# Patient Record
Sex: MALE | Race: WHITE
[De-identification: names, ages, dates, MRNs, and addresses within clinical notes are randomized per-mention and may not be internally consistent; named-entity substitution may affect disease eponyms.]

---

## 2019-04-09 ENCOUNTER — HOSPITAL ENCOUNTER (EMERGENCY)
Dept: HOSPITAL 97 - ER | Age: 69
Discharge: HOME | End: 2019-04-09
Payer: COMMERCIAL

## 2019-04-09 DIAGNOSIS — R10.9: Primary | ICD-10-CM

## 2019-04-09 DIAGNOSIS — E78.5: ICD-10-CM

## 2019-04-09 DIAGNOSIS — I10: ICD-10-CM

## 2019-04-09 DIAGNOSIS — E11.9: ICD-10-CM

## 2019-04-09 LAB
ALBUMIN SERPL BCP-MCNC: 3.5 G/DL (ref 3.4–5)
ALP SERPL-CCNC: 57 U/L (ref 45–117)
ALT SERPL W P-5'-P-CCNC: 39 U/L (ref 12–78)
AST SERPL W P-5'-P-CCNC: 25 U/L (ref 15–37)
BUN BLD-MCNC: 17 MG/DL (ref 7–18)
GLUCOSE SERPLBLD-MCNC: 102 MG/DL (ref 74–106)
HCT VFR BLD CALC: 44 % (ref 39.6–49)
LIPASE SERPL-CCNC: 162 U/L (ref 73–393)
LYMPHOCYTES # SPEC AUTO: 1.6 K/UL (ref 0.7–4.9)
PMV BLD: 9.7 FL (ref 7.6–11.3)
POTASSIUM SERPL-SCNC: 4.1 MMOL/L (ref 3.5–5.1)
RBC # BLD: 5.04 M/UL (ref 4.33–5.43)
UA COMPLETE W REFLEX CULTURE PNL UR: (no result)

## 2019-04-09 PROCEDURE — 36415 COLL VENOUS BLD VENIPUNCTURE: CPT

## 2019-04-09 PROCEDURE — 83690 ASSAY OF LIPASE: CPT

## 2019-04-09 PROCEDURE — 81015 MICROSCOPIC EXAM OF URINE: CPT

## 2019-04-09 PROCEDURE — 80076 HEPATIC FUNCTION PANEL: CPT

## 2019-04-09 PROCEDURE — 81003 URINALYSIS AUTO W/O SCOPE: CPT

## 2019-04-09 PROCEDURE — 93005 ELECTROCARDIOGRAM TRACING: CPT

## 2019-04-09 PROCEDURE — 85025 COMPLETE CBC W/AUTO DIFF WBC: CPT

## 2019-04-09 PROCEDURE — 80048 BASIC METABOLIC PNL TOTAL CA: CPT

## 2019-04-09 PROCEDURE — 99283 EMERGENCY DEPT VISIT LOW MDM: CPT

## 2019-04-09 NOTE — XMS REPORT
Marky Hendrickson DO PA

 Created on:2019



Patient:López Donaldson

Sex:Male

:1950

External Reference #:[id number]





Demographics







 Address  [address] [place] [address]

 

 Phone  [phone]

 

 Preferred Language  Unknown

 

 Marital Status  Unknown

 

 Pentecostalism Affiliation  Unknown

 

 Race  Unknown

 

 Ethnic Group  Unknown









Author







 Organization  [employer]









Care Team Providers







 Name  Role  Phone

 

 Marky Hendrickson  Provider Role  Unavailable









Allergies

No Known Allergies



Problems







 Problem Type  Condition  Code  Onset Dates  Condition Status

 

 Problem  Mixed hyperlipidemia  E78.2    Active

 

 Problem  Generalized anxiety disorder  F41.1    Active

 

 Problem  Hypersomnia due to another medical  G47.14    Active



   condition      

 

 Problem  Pain in left hip  M25.552    Active

 

 Problem  Generalized abdominal pain  R10.84    Active

 

 Problem  Other chronic pain  G89.29    Active

 

 Problem  Type 2 diabetes mellitus with  E11.40    Active



   diabetic neuropathy, without      



   long-term current use of insulin      

 

 Problem  Chronic kidney disease, unspecified  N18.9    Active



   CKD stage      

 

 Problem  Vision decreased  H54.7    Active

 

 Problem  Routine medical exam  Z00.00    Active

 

 Problem  Hypertensive chronic kidney disease  I12.9    Active



   with stage 1 through stage 4      



   chronic kidney disease, or      



   unspecified chronic kidney disease      

 

 Problem  Essential (primary) hypertension  I10    Active

 

 Problem  DM renal manif type II  E11.29    Active

 

 Problem  Enlarged prostate without lower  N40.0    Active



   urinary tract symptoms (luts)      

 

 Problem  Chronic major depressive disorder,  F33.9    Active



   recurrent episode      

 

 Problem  DM neuro manif type II  E11.49    Active

 

 Problem  Chronic kidney disease due to  E11.22    Active



   diabetes mellitus      

 

 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







Medications

No Known Medications



Results

No Known Results



Summary Purpose

[employer]mission

## 2019-04-09 NOTE — XMS REPORT
Marky Hendrickson DO, PA

 Created on:2018



Patient:López Donaldson

Sex:Male

:1950

External Reference #:[id number]





Demographics







 [place] [address]

 

 Phone  [phone]

 

 Preferred Language  Unknown

 

 Marital Status  Unknown

 

 Sabianism Affiliation  Unknown

 

 Race  Unknown

 

 Ethnic Group  Unknown









Author







 [facility]









Care Team Providers







 Name  Role  Phone

 

 Marky Hendrickson  Provider Role  Unavailable









Allergies, Adverse Reactions, Alerts







 Substance  Reaction  Event Type

 

 Morphine Sulfate  Info Not Available  Drug Allergy







Problems







 Problem Type  Condition  Code  Onset Dates  Condition Status

 

 Assessment  Essential (primary) hypertension  I10    Active

 

 Assessment  Chronic major depressive disorder,  F33.9    Active



   recurrent episode      

 

 Assessment  Generalized anxiety disorder  F41.1    Active

 

 Assessment  Body mass index (BMI) of 27.0-27.9  Z68.27    Active



   in adult      

 

 Assessment  Hypersomnia due to another medical  G47.14    Active



   condition      

 

 Assessment  Chronic kidney disease, unspecified  N18.9    Active



   CKD stage      

 

 Problem  Routine medical exam  Z00.00    Active

 

 Assessment  Type 2 diabetes mellitus with  E11.40    Active



   diabetic neuropathy, without      



   long-term current use of insulin      

 

 Problem  Enlarged prostate without lower  N40.0    Active



   urinary tract symptoms (luts)      

 

 Assessment  DM neuro manif type II  E11.49    Active

 

 Problem  DM neuro manif type II  E11.49    Active

 

 Problem  Hypertensive chronic kidney disease  I12.9    Active



   with stage 1 through stage 4      



   chronic kidney disease, or      



   unspecified chronic kidney disease      

 

 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

 

 Problem  Hypersomnia due to another medical  G47.14    Active



   condition      

 

 Problem  Mixed hyperlipidemia  E78.2    Active

 

 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

 

 Assessment  Hypertensive chronic kidney disease  I12.9    Active



   with stage 1 through stage 4      



   chronic kidney disease, or      



   unspecified chronic kidney disease      

 

 Problem  Generalized anxiety disorder  F41.1    Active

 

 Assessment  Generalized abdominal pain  R10.84    Active

 

 Problem  Chronic major depressive disorder,  F33.9    Active



   recurrent episode      

 

 Problem  Essential (primary) hypertension  I10    Active

 

 Problem  DM renal manif type II  E11.29    Active

 

 Problem  Chronic kidney disease due to  E11.22    Active



   diabetes mellitus      

 

 Assessment  Chronic kidney disease due to  E11.22    Active



   diabetes mellitus      

 

 Assessment  DM renal manif type II  E11.29    Active

 

 Assessment  Enlarged prostate without lower  N40.0    Active



   urinary tract symptoms (luts)      

 

 Assessment  Mixed hyperlipidemia  E78.2    Active

 

 Problem  Chronic kidney disease, unspecified  N18.9    Active



   CKD stage      

 

 Problem  Type 2 diabetes mellitus with  E11.40    Active



   diabetic neuropathy, without      



   long-term current use of insulin      

 

 Assessment  Routine medical exam  Z00.00    Active







Medications







 Medication  Code  Code  Instructions  Start  End  Status  Dosage



   System      Date  Date    

 

 Escitalopram  NDC  33960533151  10 mg Orally  Oct 22,    Active  1 tablet



 Oxalate      once every  2018      



       night        

 

 Tamsulosin HCl  NDC  [address]-77  0.4 MG Orally      Active  as directed

 

 GlyBURIDE  NDC  [id number]  5 MG Orally      Active  1 tablet



       Once a day        with



               breakfast



               or the



               first main



               meal of the



               day

 

 Aspirin 81  NDC  69961385620  81 MG Orally      Active  1 tablet



       Once a day        

 

 Simvastatin  NDC  23044171195  20 MG Orally      Active  1 tablet in



       Once a day        the evening

 

 Lisinopril  NDC  57000258946  40 MG Orally      Active  1 tablet



       Once a day        







Vital Signs







 Date/Time:  Oct 22, 2018

 

 BMI  27.40 Index

 

 Weight  191 lbs

 

 Height  70 in

 

 Temperature  98.1 F

 

 Cardiac Monitoring Heart Rate  84 /min

 

 Blood Pressure Diastolic  66 mm Hg

 

 Blood Pressure Systolic  136 mm Hg







Results

No Known Results



Summary Purpose

[employer] Submission

## 2019-04-09 NOTE — XMS REPORT
Continuity of Care Document

 Created on:2019



Patient:JOSEPH CEE

Sex:Male

:1950

External Reference #:[id number]





Demographics







 Address  [address] [place] [address]

 

 Phone  [phone]

 

 Preferred Language  Unknown

 

 Marital Status  Unknown

 

 Faith Affiliation  Unknown

 

 Race  Unknown

 

 Ethnic Group  Unknown









Author







 Organization  [employer]









Problems







 Problem  Status  Onset  Classification  Date  Comments  Source



     Date    Reported    



             



             



             

 

 Mixed  Active    Problem  2019    Marky MORALES



 hyperlipidemia            [facility]



             



             

 

 Generalized  Active    Problem  2019    Marky MORALES



 anxiety disorder            [facility]



             



             

 

 Hypersomnia due to  Active    Problem  2019    Marky MORALES



 another medical            [facility]



 condition            



             

 

 Pain in left hip  Active    Problem  2019    Marky E



             [facility]



             



             

 

 Generalized  Active    Problem  2019    Marky MORALES



 abdominal pain            [facility]



             



             

 

 Other chronic pain  Active    Problem  2019    Marky MORALES



             [facility]



             



             

 

 Type 2 diabetes  Active    Problem  2019    Marky MORALES



 mellitus with            [facility]



 diabetic            



 neuropathy,            



 without long-term            



 current use of            



 insulin            



             

 

 Chronic kidney  Active    Problem  2019    Marky MORALES



 disease,            [facility]



 unspecified CKD            



 stage            



             

 

 Vision decreased  Active    Problem  2019    Marky MORALES



             [facility]



             



             

 

 Routine medical  Active    Problem  2019    Marky MORALES



 exam            [facility]



             



             

 

 Hypertensive  Active    Problem  2019    Marky MORALES



 chronic kidney            [facility]



 disease with stage            



 1 through stage 4            



 chronic kidney            



 disease, or            



 unspecified            



 chronic kidney            



 disease            



             

 

 Essential  Active    Problem  2019    Marky MORALES



 hypertension            [facility]



             



             

 

 DM renal manif  Active    Problem  2019    Marky MORALES



 type II            [facility]



             



             

 

 Enlarged prostate  Active    Problem  2019    Marky MORALES



 without lower            [facility]



 urinary tract            



 symptoms            



             

 

 Chronic major  Active    Problem  2019    Marky MORALES



 depressive            [facility]



 disorder,            



 recurrent episode            



             



             

 

 DM neuro manif  Active    Problem  2019    Marky MORALES



 type II            [facility]



             



             

 

 Chronic kidney  Active    Problem  2019    Marky MORALES



 disease due to            [facility]



 diabetes mellitus            



             



             

 

 Arthritis due to  Active    Problem  2019    Marky MORALES



 diabetes            [facility]



             



             

 

 Body mass index of  Active    Diagnosis  03/15/2019    Marky MORALES



 27.0-27.9 in adult            [facility]



             



             

 

 Left hip pain  Active    Diagnosis  03/15/2019    Marky MORALES



             [facility]



             



             

 

 Left anterior  Active    Diagnosis  03/15/2019    Marky MORALES



 shoulder pain            [facility]



             



             







Medications







 Medication  Details  Route  Status  Patient  Ordering  Order  Source



         Instructions  Provider  Date  



               



               



               

 

 Meloxicam  1 tablet  Orally  Active  15 MG Orally  [facility]    Marky MORALES



         Once a day for    019  [facility]



         arthritis pain      



               



               

 

 Citalopram  1 tablet  Orally  Active  20 mg Orally  [facility]    Marky MORALES



 Hydrobromide        once every    019  [facility]



         night      



               



               

 

 Escitalopram  1 tablet  Orally  Active  10 mg Orally  [facility]  10/22/2  
Marky MORALES



 Oxalate        once every    018  [facility]



         night      



               



               

 

 GlyBURIDE  1 tablet  Orally  Active  5 MG Orally  [facility]    Marky MORALES



   with      Once a day      [facility]



   breakfast or            



   the first            



   main meal of            



   the day            



               



               

 

 Aspirin 81  1 tablet  Orally  Active  81 MG Orally  [facility]    Marky MORALES



         Once a day      [facility]



               



               

 

 Simvastatin  1 tablet in  Orally  Active  20 MG Orally  [facility]    Marky MORALES



   the evening      Once a day      [facility]



               



               

 

 Tamsulosin HCl  as directed  Orally  Active  0.4 MG Orally  [facility]    Marky MORALES



               [facility]



               



               

 

 Lisinopril  1 tablet  Orally  Active  40 MG Orally  [facility]    Marky MORALES



         Once a day      [facility]



               



               







Allergies, Adverse Reactions, Alerts







 Substance  Category  Reaction  Severity  Reaction  Status  Date  Comments  
Source



         type    Reported    



                 



                 



                 

 

 Morphine  Adverse  Info Not    Adverse  Active      Marky



 Sulfate  Reaction  Available    Reaction    9    E



                 [facility]



                 



                 







Immunizations







 Immunization  Date Given  Site  Status  Last Updated  Comments  Source



             



             







Results







 Order  Results  Value  Reference  Date  Interpretation  Comments  Source



 Name      Range        



               



               







Vital Signs







 Vital Sign  Value  Date  Comments  Source



         

 

 Weight  183.2  2019    Marky MORALES [facility]



         



         

 

 Height  70  2019    Marky E [facility]



         



         

 

 Temperature Oral (F)  97.5 F  2019    Marky MORALES [facility]



         



         

 

 Heart Rate  99  2019    Marky E [facility]



         



         

 

 Diastolic (mm Hg)  84  2019    Marky E [facility]



         



         

 

 Systolic (mm Hg)  142  2019    Marky E [facility]



         



         

 

 Weight  190  2018    Marky E [facility]



         



         

 

 Height  70  2018    Marky E [facility]



         



         

 

 Temperature Oral (F)  98.5 F  2018    Marky E [facility]



         



         

 

 Heart Rate  80  2018    Marky E [facility]



         



         

 

 Diastolic (mm Hg)  110  2018    Marky E [facility]



         



         

 

 Systolic (mm Hg)  180  2018    Marky E [facility]



         



         

 

 Weight  191  10/22/2018    Marky E [facility]



         



         

 

 Height  70  10/22/2018    Marky E [facility]



         



         

 

 Temperature Oral (F)  98.1 F  10/22/2018    Marky Hendrickson



         



         

 

 Heart Rate  84  10/22/2018    Marky Hendrickson



         



         

 

 Diastolic (mm Hg)  66  10/22/2018    Marky Hendrickson



         



         

 

 Systolic (mm Hg)  136  10/22/2018    Marky Hendrickson



         



         







Encounters







 Location  Location  Encounter  Encounter  Reason  Attending  ADM  DC  Status  
Source



   Details  Type  Number  For  Provider  Date  Date    



         Visit          



                   



                   



                   







Procedures







 Procedure  Code  Date  Perfomer  Comments  Source

## 2019-04-09 NOTE — EDPHYS
Physician Documentation                                                                           

 [facility]                                                                 

Name: López Donaldson                                                                               

Age: 68 yrs                                                                                       

Sex: Male                                                                                         

: 1950                                                                                   

MRN: [id number]                                                                                   

Arrival Date: 2019                                                                          

Time: 05:01                                                                                       

Account#: [id number]                                                                            

Bed 4                                                                                             

Private MD:                                                                                       

ED Physician Theo May                                                                       

HPI:                                                                                              

                                                                                             

05:59 This 68 yrs old  Male presents to ER via Ambulatory with complaints of         gs  

      Abdominal Pain.                                                                             

05:59 The patient presents with abdominal pain that is diffuse. Onset: The symptoms/episode   gs  

      began/occurred 2 year(s) ago. Associated signs and symptoms: Pertinent negatives:           

      nausea and vomiting, chest pain, constipation, diarrhea, dysuria, shortness of breath,      

      testicular pain, vomiting blood. The symptoms are described as dull. Modifying factors:     

      The symptoms are alleviated by nothing, the symptoms are aggravated by nothing.             

      Severity of pain: At its worst the pain was mild in the emergency department the pain       

      is unchanged. The patient has experienced similar episodes in the past, chronically.        

      says has had full work up.                                                                  

                                                                                                  

Historical:                                                                                       

- Allergies:                                                                                      

05:38 No Known Allergies;                                                                     ea  

- Home Meds:                                                                                      

05:38 Zyrtec Oral [Active]; Simvastatin Oral [Active]; Lisinopril Oral [Active]; Flomax Oral  ea  

      [Active]; Glyburide Oral [Active]; probiotics [Active]; senna oral oral [Active];           

- PMHx:                                                                                           

05:38 Hypertension; Hyperlipidemia; Diabetes - NIDDM;                                         ea  

                                                                                                  

- Immunization history:: Adult Immunizations up to date.                                          

- Social history:: Smoking status: Patient/guardian denies using tobacco.                         

- Ebola Screening: : No symptoms or risks identified at this time.                                

                                                                                                  

                                                                                                  

ROS:                                                                                              

05:59 All other systems are negative.                                                         gs  

                                                                                                  

Exam:                                                                                             

05:59 Head/Face:  Normocephalic, atraumatic. Eyes:  Pupils equal round and reactive to light, gs  

      extra-ocular motions intact.  Lids and lashes normal.  Conjunctiva and sclera are           

      non-icteric and not injected.  Cornea within normal limits.  Periorbital areas with no      

      swelling, redness, or edema. ENT:  Nares patent. No nasal discharge, no septal              

      abnormalities noted.  Tympanic membranes are normal and external auditory canals are        

      clear.  Oropharynx with no redness, swelling, or masses, exudates, or evidence of           

      obstruction, uvula midline.  Mucous membranes moist. Neck:  Trachea midline, no             

      thyromegaly or masses palpated, and no cervical lymphadenopathy.  Supple, full range of     

      motion without nuchal rigidity, or vertebral point tenderness.  No Meningismus.             

      Chest/axilla:  Normal chest wall appearance and motion.  Nontender with no deformity.       

      No lesions are appreciated. Cardiovascular:  Regular rate and rhythm with a normal S1       

      and S2.  No gallops, murmurs, or rubs.  Normal PMI, no JVD.  No pulse deficits.             

      Respiratory:  Lungs have equal breath sounds bilaterally, clear to auscultation and         

      percussion.  No rales, rhonchi or wheezes noted.  No increased work of breathing, no        

      retractions or nasal flaring. Abdomen/GI:  Soft, non-tender, with normal bowel sounds.      

      No distension or tympany.  No guarding or rebound.  No evidence of tenderness               

      throughout. Back:  No spinal tenderness.  No costovertebral tenderness.  Full range of      

      motion. Skin:  Warm, dry with normal turgor.  Normal color with no rashes, no lesions,      

      and no evidence of cellulitis. MS/ Extremity:  Pulses equal, no cyanosis.                   

      Neurovascular intact.  Full, normal range of motion. Neuro:  Awake and alert, GCS 15,       

      oriented to person, place, time, and situation.  Cranial nerves II-XII grossly intact.      

      Motor strength 5/5 in all extremities.  Sensory grossly intact.  Cerebellar exam            

      normal.  Normal gait.                                                                       

05:59 Constitutional: The patient appears in no acute distress, alert, awake.                     

06:46 ECG was reviewed by the Attending Physician.                                              

                                                                                                  

Vital Signs:                                                                                      

05:38  / 93; Pulse 77; Resp 18; Temp 98.1; Pulse Ox 99% on R/A; Weight 82.55 kg; Height ea  

      5 ft. 10 in. (177.80 cm); Pain 6/10;                                                        

05:38 Body Mass Index 26.11 (82.55 kg, 177.80 cm)                                             ea  

                                                                                                  

MDM:                                                                                              

05:30 Patient medically screened.                                                               

05:59 Differential diagnosis: gastritis, gastroesophageal reflux disease, Irritable bowel     gs  

      syndrome, pancreatitis, Peptic Ulcer Disease. Data reviewed: vital signs, nurses notes,     

      lab test result(s), radiologic studies. Response to treatment: the patient's symptoms       

      have mildly improved after treatment, and as a result, I will discharge patient.            

                                                                                                  

                                                                                             

05:32 Order name: Basic Metabolic Panel; Complete Time: 06:26                                   

                                                                                             

05:32 Order name: CBC with Diff; Complete Time: 06:26                                           

                                                                                             

05:32 Order name: Hepatic Function; Complete Time: 06:26                                        

                                                                                             

05:32 Order name: Lipase; Complete Time: 06:26                                                  

                                                                                             

05:32 Order name: Urine Microscopic Only                                                        

                                                                                             

06:39 Order name: Urine Dipstick--Ancillary (enter results)                                   ar5 

                                                                                             

05:32 Order name: IV Saline Lock; Complete Time: 06:31                                          

                                                                                             

05:32 Order name: Labs collected and sent; Complete Time: 06:31                                 

                                                                                             

05:32 Order name: Urine Dipstick-Ancillary (obtain specimen); Complete Time: 06:31              

                                                                                             

05:47 Order name: EKG - Nurse/Tech; Complete Time: 06:54                                        

                                                                                             

06:26 Order name: EKG; Complete Time: 06:26                                                     

                                                                                             

06:40 Order name: Urine Dipstick-Ancillary; Complete Time: 06:49                              EDMS

                                                                                                  

EC:46 Rate is 75 beats/min. Rhythm is regular. TX interval is normal. QRS interval is normal. gs  

      T waves are Normal. No ST changes noted. Clinical impression: Normal ECG. Interpreted       

      by me.                                                                                      

                                                                                                  

Administered Medications:                                                                         

No medications were administered                                                                  

                                                                                                  

                                                                                                  

Disposition:                                                                                      

19 06:48 Discharged to Home. Impression: Generalized abdominal pain.                        

- Condition is Stable.                                                                            

- Discharge Instructions: Abdominal Pain, Adult, Chronic Pain.                                    

                                                                                                  

- Work release form, Medication Reconciliation Form, Thank You Letter, Antibiotic                 

  Education, Prescription Opioid Use form.                                                        

- Follow up: Private Physician; When: 1 - 2 days; Reason: Re-evaluation by your                   

  physician.                                                                                      

                                                                                                  

                                                                                                  

                                                                                                  

Signatures:                                                                                       

[facility]                                                 

Aline Edmondson RN RN ea Starr, Gregory, MD MD                                                      

                                                                                                  

Corrections: (The following items were deleted from the chart)                                    

07:03 06:48 2019 06:48 Discharged to Home. Impression: Generalized abdominal pain.      ea  

      Condition is Stable. Forms are Medication Reconciliation Form, Thank You Letter,            

      Antibiotic Education, Prescription Opioid Use. Follow up: Private Physician; When: 1 -      

      2 days; Reason: Re-evaluation by your physician. gs                                         

                                                                                                  

**************************************************************************************************

## 2019-04-09 NOTE — XMS REPORT
Marky Hendrickson DO, PA

 Created on:2018



Patient:López Donaldson

Sex:Male

:1950

External Reference #:[id number]





Demographics







 [place] [address]

 

 Phone  [phone]

 

 Preferred Language  Unknown

 

 Marital Status  Unknown

 

 Mandaeism Affiliation  Unknown

 

 Race  Unknown

 

 Ethnic Group  Unknown









Author







 [facility]









Care Team Providers







 Name  Role  Phone

 

 Marky Hendrickson  Provider Role  Unavailable









Allergies, Adverse Reactions, Alerts







 Substance  Reaction  Event Type

 

 Morphine Sulfate  Info Not Available  Drug Allergy







Problems







 Problem Type  Condition  Code  Onset Dates  Condition Status

 

 Problem  Mixed hyperlipidemia  E78.2    Active

 

 Problem  Generalized anxiety disorder  F41.1    Active

 

 Problem  Hypersomnia due to another medical  G47.14    Active



   condition      

 

 Problem  Pain in left hip  M25.552    Active

 

 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

 

 Problem  Generalized abdominal pain  R10.84    Active

 

 Assessment  Essential (primary) hypertension  I10    Active

 

 Assessment  Vision decreased  H54.7    Active

 

 Problem  Other chronic pain  G89.29    Active

 

 Problem  Type 2 diabetes mellitus with  E11.40    Active



   diabetic neuropathy, without      



   long-term current use of insulin      

 

 Problem  Chronic kidney disease, unspecified  N18.9    Active



   CKD stage      

 

 Problem  Vision decreased  H54.7    Active

 

 Problem  Routine medical exam  Z00.00    Active

 

 Problem  Hypertensive chronic kidney disease  I12.9    Active



   with stage 1 through stage 4      



   chronic kidney disease, or      



   unspecified chronic kidney disease      

 

 Problem  Essential (primary) hypertension  I10    Active

 

 Assessment  Pain in left hip  M25.552    Active

 

 Problem  DM renal manif type II  E11.29    Active

 

 Problem  Enlarged prostate without lower  N40.0    Active



   urinary tract symptoms (luts)      

 

 Assessment  Other chronic pain  G89.29    Active

 

 Problem  Chronic major depressive disorder,  F33.9    Active



   recurrent episode      

 

 Problem  DM neuro manif type II  E11.49    Active

 

 Assessment  Generalized abdominal pain  R10.84    Active

 

 Problem  Chronic kidney disease due to  E11.22    Active



   diabetes mellitus      

 

 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







Medications







 Medication  Code  Code  Instructions  Start  End  Status  Dosage



   System      Date  Date    

 

 GlyBURIDE  NDC  39671269262  5 MG Orally      Active  1 tablet



       Once a day        with



               breakfast



               or the



               first main



               meal of the



               day

 

 Aspirin 81  NDC  97436625020  81 MG Orally      Active  1 tablet



       Once a day        

 

 Simvastatin  NDC  23838313173  20 MG Orally      Active  1 tablet in



       Once a day        the evening

 

 Tamsulosin HCl  NDC  [address]-58  0.4 MG Orally      Active  as directed

 

 Escitalopram  NDC  68436291291  10 mg Orally  Oct 22,    Active  1 tablet



 Oxalate      once every  2018      



       night        

 

 Lisinopril  NDC  60264635740  40 MG Orally      Active  1 tablet



       Once a day        







Vital Signs







 Date/Time:  Dec 21, 2018

 

 BMI  27.26 Index

 

 Weight  190 lbs

 

 Height  70 in

 

 Temperature  98.5 F

 

 Cardiac Monitoring Heart Rate  80 /min

 

 Blood Pressure Diastolic  110 mm Hg

 

 Blood Pressure Systolic  180 mm Hg







Results

No Known Results



Summary Purpose

eClinicalWorks Submission

## 2019-04-09 NOTE — EKG
Test Date:    2019-04-09               Test Time:    06:40:00

Technician:   JEANNE                                     

                                                     

MEASUREMENT RESULTS:                                       

Intervals:                                           

Rate:         75                                     

RI:           162                                    

QRSD:         72                                     

QT:           376                                    

QTc:          419                                    

Axis:                                                

P:            58                                     

RI:           162                                    

QRS:          51                                     

T:            24                                     

                                                     

INTERPRETIVE STATEMENTS:                                       

                                                     

Normal sinus rhythm

Normal ECG

Compared to ECG 04/24/2000 07:17:00

T-wave abnormality no longer present

Possible ischemia no longer present



Electronically Signed On 04-09-19 10:01:19 CDT by Pete Kaufman

## 2019-04-09 NOTE — XMS REPORT
Marky Hendrickson DO, PA

 Created on:2019



Patient:López Donaldson

Sex:Male

:1950

External Reference #:[id number]





Demographics







 [place] [address] [place] [address]

 

 Phone  [phone]

 

 Preferred Language  Unknown

 

 Marital Status  Unknown

 

 [employer] Affiliation  Unknown

 

 Race  Unknown

 

 Ethnic Group  Unknown









Author







 [facility]









Care Team Providers







 Name  Role  Phone

 

 Marky Hendrickson  Provider Role  Unavailable









Allergies, Adverse Reactions, Alerts







 Substance  Reaction  Event Type

 

 Morphine Sulfate  Info Not Available  Drug Allergy







Problems







 Problem Type  Condition  Code  Onset Dates  Condition Status

 

 Assessment  Enlarged prostate without lower  N40.0    Active



   urinary tract symptoms (luts)      

 

 Assessment  Essential (primary) hypertension  I10    Active

 

 Assessment  Chronic kidney disease, unspecified  N18.9    Active



   CKD stage      

 

 Assessment  Body mass index (BMI) of 27.0-27.9  Z68.27    Active



   in adult      

 

 Assessment  Type 2 diabetes mellitus with  E11.40    Active



   diabetic neuropathy, without      



   long-term current use of insulin      

 

 Assessment  DM neuro manif type II  E11.49    Active

 

 Assessment  Hypertensive chronic kidney disease  I12.9    Active



   with stage 1 through stage 4      



   chronic kidney disease, or      



   unspecified chronic kidney disease      

 

 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

 

 Assessment  Chronic major depressive disorder,  F33.9    Active



   recurrent episode      

 

 Problem  DM neuro manif type II  E11.49    Active

 

 Assessment  Left hip pain  M25.552    Active

 

 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

 

 Assessment  Left anterior shoulder pain  M25.512    Active

 

 Problem  Mixed hyperlipidemia  E78.2    Active

 

 Problem  Generalized anxiety disorder  F41.1    Active

 

 Problem  Hypersomnia due to another medical  G47.14    Active



   condition      

 

 Problem  Pain in left hip  M25.552    Active

 

 Problem  Generalized abdominal pain  R10.84    Active

 

 Assessment  DM renal manif type II  E11.29    Active

 

 Assessment  Chronic kidney disease due to  E11.22    Active



   diabetes mellitus      

 

 Problem  Other chronic pain  G89.29    Active

 

 Assessment  Mixed hyperlipidemia  E78.2    Active

 

 Problem  Type 2 diabetes mellitus with  E11.40    Active



   diabetic neuropathy, without      



   long-term current use of insulin      

 

 Problem  Chronic kidney disease, unspecified  N18.9    Active



   CKD stage      

 

 Problem  Vision decreased  H54.7    Active

 

 Problem  Routine medical exam  Z00.00    Active

 

 Assessment  Generalized anxiety disorder  F41.1    Active

 

 Problem  Hypertensive chronic kidney disease  I12.9    Active



   with stage 1 through stage 4      



   chronic kidney disease, or      



   unspecified chronic kidney disease      

 

 Assessment  Hypersomnia due to another medical  G47.14    Active



   condition      

 

 Problem  Essential (primary) hypertension  I10    Active

 

 Assessment  Routine medical exam  Z00.00    Active

 

 Problem  DM renal manif type II  E11.29    Active

 

 Problem  Enlarged prostate without lower  N40.0    Active



   urinary tract symptoms (luts)      

 

 Problem  Chronic major depressive disorder,  F33.9    Active



   recurrent episode      

 

 Problem  Chronic kidney disease due to  E11.22    Active



   diabetes mellitus      







Medications







 Medication  Code  Code  Instructions  Start  End  Status  Dosage



   System      Date  Date    

 

 Simvastatin  NDC  39179692068  20 MG Orally      Active  1 tablet in



       Once a day        the evening

 

 Aspirin 81  NDC  99520311756  81 MG Orally      Active  1 tablet



       Once a day        

 

 GlyBURIDE  NDC  42118726342  5 MG Orally      Active  1 tablet



       Once a day        with



               breakfast



               or the



               first main



               meal of the



               day

 

 Meloxicam  NDC  59327936699  15 MG Orally    Active  1 tablet



       Once a day for  2019  11,    



       arthritis pain        

 

 Tamsulosin HCl  NDC  [address]-42  0.4 MG Orally      Active  as directed

 

 Citalopram  NDC  [id number]  20 mg Orally  March    Active  1 tablet



 Hydrobromide      once every  2019      



       night        

 

 Lisinopril  NDC  92401015332  40 MG Orally      Active  1 tablet



       Once a day        

 

 Escitalopram  NDC  21400102482  10 mg Orally  Oct 22,    Active  1 tablet



 Oxalate      once every        



       night        







Vital Signs







 Date/Time:  2019

 

 BMI  26.28 Index

 

 Weight  183.2 lbs

 

 Height  70 in

 

 Temperature  97.5 F

 

 Cardiac Monitoring Heart Rate  99 /min

 

 Blood Pressure Diastolic  84 mm Hg

 

 Blood Pressure Systolic  142 mm Hg







Results

No Known Results



Summary Purpose

eClinicalWorks Submission

## 2019-04-09 NOTE — ER
Nurse's Notes                                                                                     

 [facility]                                                                 

Name: López Donaldson                                                                               

Age: 68 yrs                                                                                       

Sex: Male                                                                                         

: 1950                                                                                   

MRN: [id number]                                                                                   

Arrival Date: 2019                                                                          

Time: 05:01                                                                                       

Account#: [id number]                                                                            

Bed 4                                                                                             

Private MD:                                                                                       

Diagnosis: Generalized abdominal pain                                                             

                                                                                                  

Presentation:                                                                                     

                                                                                             

05:28 Presenting complaint: Patient states: Pt reports he has been having abdominal pain that ea  

      started two years ago and has been getting progressively worse. Pt reports yesterday he     

      started feeling like he had upset stomach and stomach pain that progressively got worse     

      throughout the day. Pt denies n/v/d or constipation. Transition of care: patient was        

      not received from another setting of care. Onset of symptoms was 2019. Risk       

      Assessment: Do you want to hurt yourself or someone else? Patient reports no desire to      

      harm self or others. Initial Sepsis Screen: Does the patient meet any 2 criteria? No.       

      Patient's initial sepsis screen is negative. Does the patient have a suspected source       

      of infection? No. Patient's initial sepsis screen is negative. Care prior to arrival:       

      None.                                                                                       

05:28 Method Of Arrival: Ambulatory                                                           ea  

05:28 Acuity: ABRAHAM 3                                                                           ea  

                                                                                                  

Historical:                                                                                       

- Allergies:                                                                                      

05:38 No Known Allergies;                                                                     ea  

- Home Meds:                                                                                      

05:38 Zyrtec Oral [Active]; Simvastatin Oral [Active]; Lisinopril Oral [Active]; Flomax Oral  ea  

      [Active]; Glyburide Oral [Active]; probiotics [Active]; senna oral oral [Active];           

- PMHx:                                                                                           

05:38 Hypertension; Hyperlipidemia; Diabetes - NIDDM;                                         ea  

                                                                                                  

- Immunization history:: Adult Immunizations up to date.                                          

- Social history:: Smoking status: Patient/guardian denies using tobacco.                         

- Ebola Screening: : No symptoms or risks identified at this time.                                

                                                                                                  

                                                                                                  

Screenin:39 Abuse screen: Denies threats or abuse. Nutritional screening: No deficits noted.        ea  

      Tuberculosis screening: No symptoms or risk factors identified. Fall Risk None              

      identified.                                                                                 

                                                                                                  

Assessment:                                                                                       

05:28 General: Appears in no apparent distress. Behavior is calm, cooperative, appropriate    ea  

      for age. Pain: Complains of pain in abdomen. Neuro: Level of Consciousness is awake,        

      alert, obeys commands, Oriented to person, place, time, situation. Cardiovascular:          

      Patient's skin is warm and dry. Respiratory: Airway is patent Respiratory effort is         

      even, unlabored, Respiratory pattern is regular, symmetrical. GI: Bowel sounds present      

      X 4 quads. Abd is soft and non tender. Derm: Skin is pink, warm \T\ dry.                    

06:50 Reassessment: Patient and/or family updated on plan of care and expected duration. Pain ea  

      level reassessed. Patient is alert, oriented x 3, equal unlabored respirations, skin        

      warm/dry/pink. Discharge instruction given to patient, verbalized the understanding of      

      instruction.                                                                                

                                                                                                  

Vital Signs:                                                                                      

05:38  / 93; Pulse 77; Resp 18; Temp 98.1; Pulse Ox 99% on R/A; Weight 82.55 kg; Height ea  

      5 ft. 10 in. (177.80 cm); Pain 6/10;                                                        

05:38 Body Mass Index 26.11 (82.55 kg, 177.80 cm)                                             ea  

                                                                                                  

ED Course:                                                                                        

05:01 Patient arrived in ED.                                                                  am2 

05:28 Arm band placed on right wrist. Patient placed in an exam room, on a stretcher, on      ea  

      pulse oximetry.                                                                             

05:30 Aline Edmondson RN is Primary Nurse.                                                    ea  

05:30 Theo May MD is Attending Physician.                                                

05:39 Patient has correct armband on for positive identification. Placed in gown. Bed in low  ea  

      position. Call light in reach. Side rails up X2.                                            

05:43 Triage completed.                                                                       ea  

06:48 No provider procedures requiring assistance completed. IV discontinued, intact,         ea  

      bleeding controlled, No redness/swelling at site. Pressure dressing applied.                

                                                                                                  

Administered Medications:                                                                         

No medications were administered                                                                  

                                                                                                  

                                                                                                  

Outcome:                                                                                          

06:48 Discharge ordered by MD.                                                                  

06:58 Discharged to home ambulatory.                                                          ea  

06:58 Condition: good                                                                             

06:58 Instructed on discharge instructions, follow up and referral plans. Demonstrated            

      understanding of instructions, follow-up care.                                              

07:03 Patient left the ED.                                                                    ea  

                                                                                                  

Signatures:                                                                                       

Shweta Cuellar                               am2                                                  

Aline Edmondson RN RN ea Starr, Gregory, MD MD                                                      

                                                                                                  

**************************************************************************************************

## 2021-05-18 ENCOUNTER — HOSPITAL ENCOUNTER (EMERGENCY)
Dept: HOSPITAL 97 - ER | Age: 71
Discharge: HOME | End: 2021-05-18
Payer: COMMERCIAL

## 2021-05-18 VITALS — TEMPERATURE: 98.3 F | OXYGEN SATURATION: 97 % | SYSTOLIC BLOOD PRESSURE: 139 MMHG | DIASTOLIC BLOOD PRESSURE: 84 MMHG

## 2021-05-18 DIAGNOSIS — Z88.5: ICD-10-CM

## 2021-05-18 DIAGNOSIS — Z79.82: ICD-10-CM

## 2021-05-18 DIAGNOSIS — E11.9: ICD-10-CM

## 2021-05-18 DIAGNOSIS — K59.00: Primary | ICD-10-CM

## 2021-05-18 DIAGNOSIS — I10: ICD-10-CM

## 2021-05-18 PROCEDURE — 99284 EMERGENCY DEPT VISIT MOD MDM: CPT

## 2021-05-18 NOTE — EDPHYS
Physician Documentation                                                                           

 [facility]                                                                 

Name: López Donaldson                                                                               

Age: 70 yrs                                                                                       

Sex: Male                                                                                         

: 1950                                                                                   

MRN: [id number]                                                                                   

Arrival Date: 2021                                                                          

Time: 01:44                                                                                       

Account#: [id number]                                                                            

Bed 16                                                                                            

Private MD:                                                                                       

ED Physician Javi Frey                                                                             

HPI:                                                                                              

                                                                                             

02:27 This 70 yrs old  Male presents to ER via Ambulatory with complaints of         pkl 

      Hemorrhoids.                                                                                

02:27 The patient presents to the emergency department with pain in the rectal area, that is  pkl 

      moderate. Onset: The symptoms/episode began/occurred today. Context: the patient            

      Patient having difficulty moving his bowel because of rectal pain. The patient has          

      experienced a previous episode, approximately 3 months ago.                                 

                                                                                                  

Historical:                                                                                       

- Allergies:                                                                                      

02:13 Morphine;                                                                               bb  

- Home Meds:                                                                                      

02:13 lisinopril Oral [Active]; finasteride oral oral [Active]; Glyburide Oral [Active];      bb  

      aspirin 81 mg Oral chew 1 tab once daily [Active]; Omeprazole Oral [Active];                

- PMHx:                                                                                           

02:13 Diabetes - NIDDM; Hyperlipidemia; Hypertension;                                         bb  

                                                                                                  

- Immunization history:: Adult Immunizations up to date.                                          

- Social history:: Smoking status: Patient denies any tobacco usage or history of.                

                                                                                                  

                                                                                                  

ROS:                                                                                              

02:27 Eyes: Negative for injury, pain, redness, and discharge, ENT: Negative for injury,      pkl 

      pain, and discharge, Neck: Negative for injury, pain, and swelling, Cardiovascular:         

      Negative for chest pain, palpitations, and edema, Respiratory: Negative for shortness       

      of breath, cough, wheezing, and pleuritic chest pain.                                       

02:27 Abdomen/GI: Positive for constipation, rectal pain.                                         

02:27 Back: Negative for acute changes.                                                           

02:27 : Negative for urinary symptoms.                                                          

02:27 MS/extremity: Negative for acute changes.                                                   

02:27 Skin: Negative for rash.                                                                    

02:27 Neuro: Negative for altered mental status.                                                  

                                                                                                  

Exam:                                                                                             

02:27 Head/Face:  Normocephalic, atraumatic. Eyes:  Pupils equal round and reactive to light, pkl 

      extra-ocular motions intact.  Lids and lashes normal.  Conjunctiva and sclera are           

      non-icteric and not injected.  Cornea within normal limits.  Periorbital areas with no      

      swelling, redness, or edema. ENT:  Nares patent. No nasal discharge, no septal              

      abnormalities noted.  Tympanic membranes are normal and external auditory canals are        

      clear.  Oropharynx with no redness, swelling, or masses, exudates, or evidence of           

      obstruction, uvula midline.  Mucous membranes moist. Neck:  Trachea midline, no             

      thyromegaly or masses palpated, and no cervical lymphadenopathy.  Supple, full range of     

      motion without nuchal rigidity, or vertebral point tenderness.  No Meningismus.             

      Chest/axilla:  Normal chest wall appearance and motion.  Nontender with no deformity.       

      No lesions are appreciated. Cardiovascular:  Regular rate and rhythm with a normal S1       

      and S2.  No gallops, murmurs, or rubs.  Normal PMI, no JVD.  No pulse deficits.             

      Respiratory:  Lungs have equal breath sounds bilaterally, clear to auscultation and         

      percussion.  No rales, rhonchi or wheezes noted.  No increased work of breathing, no        

      retractions or nasal flaring.                                                               

02:27 Abdomen/GI: Bowel sounds: normal, Palpation: abdomen is soft and non-tender, in all         

      quadrants, Rectal exam: Stool: guaiac negative, large  amount  of  stools  in  rectom.      

02:27 Back: Exam negative for acute changes.                                                      

02:27 : Exam negative for acute changes.                                                        

02:27 Musculoskeletal/extremity: Exam is negative for acute changes.                              

02:27 Skin: Exam negative for rash.                                                               

02:27 Neuro: Orientation: is normal, Mentation: is normal, Cranial nerves: grossly normal,        

      Motor: is normal.                                                                           

                                                                                                  

Vital Signs:                                                                                      

02:09  / 84; Pulse 95; Resp 16; Temp 98.3(O); Pulse Ox 97% on R/A; Weight 84.82 kg (R); bb  

      Height 5 ft. 10 in. (177.80 cm) (R); Pain 7/10;                                             

02:09 Body Mass Index 26.83 (84.82 kg, 177.80 cm)                                             bb  

                                                                                                  

MDM:                                                                                              

01:55 Patient medically screened.                                                             pkl 

02:27 Data reviewed: vital signs, nurses notes. ED course: Patient does not want to have      pkl 

      bowel movement in the ER. He would like to do it at home.                                   

                                                                                                  

Administered Medications:                                                                         

02:44 Drug: Dulcolax (bisacodyl) Suppository 10 mg Route: NH;                                 jm8 

02:50 Follow up: Response: No adverse reaction; Medication administered at discharge.         [facility] 

02:44 Drug: Magnesium Citrate Liquid 300 ml Route: PO;                                        jm8 

02:50 Follow up: Response: No adverse reaction; Medication administered at discharge.         8 

02:44 Drug: Fleet Enema (sodium phosphate) 133 ml Route: NH;                                  jm8 

02:50 Follow up: Response: No adverse reaction; Medication administered at discharge.         jm8 

                                                                                                  

                                                                                                  

Point of Care Testing:                                                                            

      Guaiac:                                                                                     

02:30 Stool Guaiac: Negative; Stool Hemoccult Control: Pass;                                  jm8 

Disposition:                                                                                      

21 02:43 Discharged to Home. Impression: Rectal pain. Constipation.                         

- Condition is Stable.                                                                            

                                                                                                  

                                                                                                  

- Medication Reconciliation Form, Thank You Letter, Antibiotic Education, Prescription            

  Opioid Use form.                                                                                

- Follow up: Private Physician; When: 1 - 2 days; Reason: Re-evaluation by your                   

  physician.                                                                                      

- Problem is new.                                                                                 

- Symptoms are unchanged.                                                                         

                                                                                                  

                                                                                                  

                                                                                                  

Signatures:                                                                                       

Javi Frey MD MD   pkEmmanuelle Weeks RN                     RN   Mejia Sung RN                     RN   jm8                                                  

                                                                                                  

Corrections: (The following items were deleted from the chart)                                    

02:51 02:43 2021 02:43 Discharged to Home. Impression: Rectal pain. Constipation.       8 

      Condition is Stable. Forms are Medication Reconciliation Form, Thank You Letter,            

      Antibiotic Education, Prescription Opioid Use. Follow up: Private Physician; When: 1 -      

      2 days; Reason: Re-evaluation by your physician. Problem is new. Symptoms are               

      unchanged. pkl                                                                              

                                                                                                  

**************************************************************************************************

## 2021-05-18 NOTE — ER
Nurse's Notes                                                                                     

 [facility]                                                                 

Name: López Donaldson                                                                               

Age: 70 yrs                                                                                       

Sex: Male                                                                                         

: 1950                                                                                   

MRN: [id number]                                                                                   

Arrival Date: 2021                                                                          

Time: 01:44                                                                                       

Account#: [id number]                                                                            

Bed 16                                                                                            

Private MD:                                                                                       

Diagnosis: Rectal pain. Constipation                                                              

                                                                                                  

Presentation:                                                                                     

                                                                                             

02:09 Chief complaint: Patient states: he is having rectal pain from his hemorrhoids and is   bb  

      unable to tolerate having a bowel movement from the pain has been taking stool softners     

      and mag citrate but nothing is helping he tried to use an enema but was unable to           

      because of the pain. Coronavirus screen: At this time, the client does not indicate any     

      symptoms associated with coronavirus-19. Ebola Screen: No symptoms or risks identified      

      at this time. Initial Sepsis Screen: Does the patient meet any 2 criteria? No.              

      Patient's initial sepsis screen is negative. Does the patient have a suspected source       

      of infection? No. Patient's initial sepsis screen is negative. Risk Assessment: Do you      

      want to hurt yourself or someone else? Patient reports no desire to harm self or            

      others. Onset of symptoms was May 16, 2021.                                                 

02:09 Method Of Arrival: Ambulatory                                                           bb  

02:09 Acuity: ABRAHAM 3                                                                           bb  

                                                                                                  

Historical:                                                                                       

- Allergies:                                                                                      

02:13 Morphine;                                                                               bb  

- Home Meds:                                                                                      

02:13 lisinopril Oral [Active]; finasteride oral oral [Active]; Glyburide Oral [Active];      bb  

      aspirin 81 mg Oral chew 1 tab once daily [Active]; Omeprazole Oral [Active];                

- PMHx:                                                                                           

02:13 Diabetes - NIDDM; Hyperlipidemia; Hypertension;                                         bb  

                                                                                                  

- Immunization history:: Adult Immunizations up to date.                                          

- Social history:: Smoking status: Patient denies any tobacco usage or history of.                

                                                                                                  

                                                                                                  

Screenin:27 Abuse screen: Denies threats or abuse. Denies injuries from another. Nutritional        jm8 

      screening: No deficits noted. Tuberculosis screening: No symptoms or risk factors           

      identified. Fall Risk None identified.                                                      

                                                                                                  

Assessment:                                                                                       

02:26 General: Appears in no apparent distress. uncomfortable, Behavior is calm, cooperative, jm8 

      appropriate for age. Pain: Complains of pain in abdomen Pain currently is 10 out of 10      

      on a pain scale. Quality of pain is described as crampy. Pain: Pain began 4 hours ago.      

      Neuro: No deficits noted. Level of Consciousness is awake, alert, obeys commands,           

      Oriented to person, place, time. Cardiovascular: No deficits noted. Respiratory: No         

      deficits noted. Airway is patent Trachea midline Respiratory effort is even, unlabored.     

      GI: Reports lower abdominal pain, constipation, rectal bleeding, hemorrhoids. : No        

      deficits noted. : No signs and/or symptoms were reported regarding the genitourinary      

      system. EENT: No deficits noted. No signs and/or symptoms were reported regarding the       

      EENT system. Derm: No deficits noted. No signs and/or symptoms reported regarding the       

      dermatologic system. Musculoskeletal: No deficits noted. No signs and/or symptoms           

      reported regarding the musculoskeletal system.                                              

                                                                                                  

Vital Signs:                                                                                      

02:09  / 84; Pulse 95; Resp 16; Temp 98.3(O); Pulse Ox 97% on R/A; Weight 84.82 kg (R); bb  

      Height 5 ft. 10 in. (177.80 cm) (R); Pain 7/10;                                             

02:09 Body Mass Index 26.83 (84.82 kg, 177.80 cm)                                             bb  

                                                                                                  

ED Course:                                                                                        

01:44 Patient arrived in ED.                                                                  es  

01:55 Javi Frey MD is Attending Physician.                                                    pkl 

02:12 Triage completed.                                                                       bb  

02:13 Arm band placed on Patient placed in an exam room, on pulse oximetry.                   bb  

02:27 Served as a chaperone during rectal exam.                                               jm8 

02:28 Patient has correct armband on for positive identification. Placed in gown. Bed in low  jm8 

      position. Call light in reach. Side rails up X2.                                            

02:52 Patient did not have IV access during this emergency room visit.                        jm8 

                                                                                                  

Administered Medications:                                                                         

02:44 Drug: Dulcolax (bisacodyl) Suppository 10 mg Route: TX;                                 jm8 

02:50 Follow up: Response: No adverse reaction; Medication administered at discharge.         jm8 

02:44 Drug: Magnesium Citrate Liquid 300 ml Route: PO;                                        jm8 

02:50 Follow up: Response: No adverse reaction; Medication administered at discharge.         jm8 

02:44 Drug: Fleet Enema (sodium phosphate) 133 ml Route: TX;                                  jm8 

02:50 Follow up: Response: No adverse reaction; Medication administered at discharge.         jm8 

                                                                                                  

                                                                                                  

Point of Care Testing:                                                                            

      Guaiac:                                                                                     

02:30 Stool Guaiac: Negative; Stool Hemoccult Control: Pass;                                  jm8 

Outcome:                                                                                          

02:43 Discharge ordered by MD.                                                                em 

02:51 Patient left the ED.                                                                    jm8 

02:53 Discharged to home ambulatory.                                                          jm8 

02:53 Condition: good                                                                             

02:53 Discharge instructions given to patient.                                                    

                                                                                                  

Signatures:                                                                                       

Javi Frey MD MD   pkElda Kaur Brenda, RN RN bb Malcaba, Joseph, RN RN jm8                                                  

                                                                                                  

Corrections: (The following items were deleted from the chart)                                    

02:50 02:28 Reassessment: MD to order medication and enema for patient. Patient states that   jm8 

      he prefers to go home and do enema and take medication. MD states you can give patient      

      mag citrate, suppositories, and enema and patient can do himself at home jm8                

                                                                                                  

**************************************************************************************************

## 2021-05-18 NOTE — XMS REPORT
Continuity of Care Document

                             Created on:May 18, 2021



Patient:JOSPEH CEE

Sex:Male

:1950

External Reference #:[id number]





Demographics







                          Address                   [address]



                                                    [place] [address]

 

                          Home Phone                [phone]

 

                          Mobile Phone              [phone]

 

                          Email Address             [place]

 

                          Preferred Language        English

 

                          Marital Status            Unknown

 

                          Restoration Affiliation     Unknown

 

                          Race                      Unknown

 

                          Additional Race(s)        Unavailable



                                                    Unavailable



                                                    Black or 

 

                          Ethnic Group              Unknown









Author







                          Organization              [facility]

t

 

                          Address                   [address]



                                                    [place] [address]

 

                          Phone                     [phone]









Support







                Name            Relationship    Address         Phone

 

                Mendoza          Daughter        Unavailable     [phone]

 

                C. Mendoza       Brother         Unavailable     [phone]









Care Team Providers







                    Name                Role                Phone

 

                    Gabriel DENSON          Primary Care Physician [phone]

 

                    Katarzyna            Attending Clinician [phone]

 

                    Gabriel DENSON          Attending Clinician [phone]









Payers







           Payer Name Policy Type Policy     Effective Date Expiration Date Sour

ce



                                 Number                           

 

           [employer]            [id number]  2020            [facility]



           [facility] [employer]                       00:00:00              - [facility]

l



           [id number]                                             Center



           020-Present                                             







Problems







       Condition Condition Condition Status Onset  Resolution Last   Treating Co

mments 

Source



       Name   Details Category        Date   Date   Treatment Clinician        



                                                 Date                 

 

       RUQ           Diagnosis Active               2020               [facility]

[facility]



       abdominal                                    02:01:59               l



       pain     RUQ                                                   [place]



              abdominal                                                  



              pain                                                    



                                                                      



                                                                      



              Active                                                  



                                                                      



                                                                      



                                                                      



                                                                      



              Diagnosis                                                  



                                                                      



                                                                      



              2020                                                  



                                                                      



                                                                      



                                                                      



                                                                      



                 Marky Hendrickson                                                  



                                                                      



                                                                      

 

       Non-compli        Diagnosis Active               2020              

 [facility]



       ance                                      02:01:59               l



                                                                      [facility]



              Non-compli                                                  



              ance                                                    



                                                                      



                                                                      



              Active                                                  



                                                                      



                                                                      



                                                                      



                                                                      



              Diagnosis                                                  



                                                                      



                                                                      



              2020                                                  



                                                                      



                                                                      



                                                                      



                                                                      



                 Marky Hendrickson                                                  



                                                                      



                                                                      

 

       Pharyngiti        Diagnosis Active               2021              

 [facility]



       s due to                                    02:00:03               l



       Streptococ                                                         Art

n



       cus    Pharyngiti                                                  



       species s due to                                                  



              Streptococ                                                  



              cus                                                     



              species                                                  



                                                                      



                                                                      



              Active                                                  



                                                                      



                                                                      



                                                                      



                                                                      



              Diagnosis                                                  



                                                                      



                                                                      



              2021                                                  



                                                                      



                                                                      



                                                                      



                                                                      



                 Marky Hendrickson                                                  



                                                                      



                                                                      

 

       Mixed         Problem Active               2021               [facility]

ia



       hyperlipid                                    02:00:52               l



       emia     Mixed                                                  [place]



              hyperlipid                                                  



              emia                                                    



                                                                      



                                                                      



              Active                                                  



                                                                      



                                                                      



                                                                      



                                                                      



              Problem                                                  



                                                                      



                                                                      



              2021                                                  



                                                                      



                                                                      



                                                                      



                                                                      



                 Marky Hendrickson                                                  



                                                                      



                                                                      

 

       Generalize        Problem Active               2021               M

[facility]



       d anxiety                                    02:00:52               l



       disorder                                                         [place]



              Generalize                                                  



              d anxiety                                                  



              disorder                                                  



                                                                      



                                                                      



              Active                                                  



                                                                      



                                                                      



                                                                      



                                                                      



              Problem                                                  



                                                                      



                                                                      



              2021                                                  



                                                                      



                                                                      



                                                                      



                                                                      



                 Marky Hendrickson                                                  



                                                                      



                                                                      

 

       Hypersomni        Problem Active               2021               M

emoria



       a due to                                    02:00:52               l



       another                                                         [facility]



       medical Hypersomni                                                  



       condition a due to                                                  



              another                                                  



              medical                                                  



              condition                                                  



                                                                      



                                                                      



              Active                                                  



                                                                      



                                                                      



                                                                      



                                                                      



              Problem                                                  



                                                                      



                                                                      



              2021                                                  



                                                                      



                                                                      



                                                                      



                                                                      



                 Marky Hendrickson                                                  



                                                                      



                                                                      

 

       Pain in        Problem Active               2021               [facility]

obinna



       left hip                                    02:00:52               l



                Pain in                                                  [facility]



              left hip                                                  



                                                                      



                                                                      



              Active                                                  



                                                                      



                                                                      



                                                                      



                                                                      



              Problem                                                  



                                                                      



                                                                      



              2021                                                  



                                                                      



                                                                      



                                                                      



                                                                      



                 Marky Hendrickson                                                  



                                                                      



                                                                      

 

       Arthritis        Problem Active               2021               Me

moria



       due to                                    02:00:52               l



       diabetes                                                         Phil



              Arthritis                                                  



              due to                                                  



              diabetes                                                  



                                                                      



                                                                      



              Active                                                  



                                                                      



                                                                      



                                                                      



                                                                      



              Problem                                                  



                                                                      



                                                                      



              2021                                                  



                                                                      



                                                                      



                                                                      



                                                                      



                 Marky Hendrickson                                                  



                                                                      



                                                                      

 

       Generalize        Problem Active               2021               M

emoria



       d                                         02:00:52               l



       abdominal                                                         [place]



       pain   Generalize                                                  



              d                                                       



              abdominal                                                  



              pain                                                    



                                                                      



                                                                      



              Active                                                  



                                                                      



                                                                      



                                                                      



                                                                      



              Problem                                                  



                                                                      



                                                                      



              2021                                                  



                                                                      



                                                                      



                                                                      



                                                                      



                 Marky Hendrickson                                                  



                                                                      



                                                                      

 

       Essential        Diagnosis Active               2021               

[facility]



       (primary)                                    02:00:52               l



       hypertensi                                                         Art

n



       on     Essential                                                  



              (primary)                                                  



              hypertensi                                                  



              on                                                      



                                                                      



                  Active                                                  



                                                                      



                                                                      



                                                                      



                                                                      



                                                                      



              Diagnosis                                                  



                                                                      



                                                                      



              2021                                                  



                                                                      



                                                                      



                                                                      



                                                                      



                 Marky Hendrickson                                                  



                                                                      



                                                                      

 

       Vision        Problem Active               2021               Mem[facility]

ia



       decreased                                    02:00:52               l



                Vision                                                  [place]



              decreased                                                  



                                                                      



                                                                      



              Active                                                  



                                                                      



                                                                      



                                                                      



                                                                      



              Problem                                                  



                                                                      



                                                                      



              2021                                                  



                                                                      



                                                                      



                                                                      



                                                                      



                 Marky Hendrickson                                                  



                                                                      



                                                                      

 

       Other         Problem Active               2021               Mem[facility]

ia



       chronic                                    02:00:52               l



       pain     Other                                                  [place]



              chronic                                                  



              pain                                                    



                                                                      



                                                                      



              Active                                                  



                                                                      



                                                                      



                                                                      



                                                                      



              Problem                                                  



                                                                      



                                                                      



              2021                                                  



                                                                      



                                                                      



                                                                      



                                                                      



                 Marky Hendrickson                                                  



                                                                      



                                                                      

 

       Type 2        Problem Active               2021               Mem[facility]

ia



       diabetes                                    02:00:52               l



       mellitus   Type 2                                                  Art

n



       with   diabetes                                                  



       diabetic mellitus                                                  



       neuropathy with                                                    



       , without diabetic                                                  



       long-term neuropathy                                                  



       current , without                                                  



       use of long-term                                                  



       insulin current                                                  



              use of                                                  



              insulin                                                  



                                                                      



                                                                      



              Active                                                  



                                                                      



                                                                      



                                                                      



                                                                      



              Problem                                                  



                                                                      



                                                                      



              2021                                                  



                                                                      



                                                                      



                                                                      



                                                                      



                 Marky Hendrickson                                                  



                                                                      



                                                                      

 

       Chronic        Problem Active               2021               [facility]

[facility]



       kidney                                    02:00:52               l



       disease,   Chronic                                                  [place]

nn



       unspecifie kidney                                                  



       d CKD  disease,                                                  



       stage  unspecifie                                                  



              d CKD                                                   



              stage                                                   



                                                                      



                                                                      



              Active                                                  



                                                                      



                                                                      



                                                                      



                                                                      



              Problem                                                  



                                                                      



                                                                      



              2021                                                  



                                                                      



                                                                      



                                                                      



                                                                      



                 Marky Hendrickson                                                  



                                                                      



                                                                      

 

       Routine        Problem Active               2021               [facility]

[facility]



       medical                                    02:00:52               l



       exam     Routine                                                  [facility]



              medical                                                  



              exam                                                    



                                                                      



                                                                      



              Active                                                  



                                                                      



                                                                      



                                                                      



                                                                      



              Problem                                                  



                                                                      



                                                                      



              2021                                                  



                                                                      



                                                                      



                                                                      



                                                                      



                 Marky Hendrickson                                                  



                                                                      



                                                                      

 

       Hypertensi        Problem Active               2021               [facility]

[facility]



       ve chronic                                    02:00:52               l



       kidney                                                         [place]



       disease Hypertensi                                                  



       with stage ve chronic                                                  



       1 through kidney                                                  



       stage 4 disease                                                  



       chronic with stage                                                  



       kidney 1 through                                                  



       disease, stage 4                                                  



       or     chronic                                                  



       unspecifie kidney                                                  



       d chronic disease,                                                  



       kidney or                                                      



       disease unspecifie                                                  



              d chronic                                                  



              kidney                                                  



              disease                                                  



                                                                      



                                                                      



              Active                                                  



                                                                      



                                                                      



                                                                      



                                                                      



              Problem                                                  



                                                                      



                                                                      



              2021                                                  



                                                                      



                                                                      



                                                                      



                                                                      



                 Marky Hendrickson                                                  



                                                                      



                                                                      

 

       DM renal        Problem Active               2021               [facility]

[facility]



       manif type                                    02:00:52               l



       II       DM renal                                                  Art

n



              manif type                                                  



              II                                                      



                                                                      



                  Active                                                  



                                                                      



                                                                      



                                                                      



                                                                      



                 Problem                                                  



                                                                      



                                                                      



                                                                      



                                                                      



                                                                      



                                                                      



                                                                      



              Marky Hendrickson                                                  



                                                                      



                                                                      

 

       Enlarged        Problem Active               2021               [facility]

[facility]



       prostate                                    02:00:52               l



       without   Enlarged                                                  [place]

nn



       lower  prostate                                                  



       urinary without                                                  



       tract  lower                                                   



       symptoms urinary                                                  



       (luts) tract                                                   



              symptoms                                                  



              (luts)                                                  



                                                                      



                                                                      



              Active                                                  



                                                                      



                                                                      



                                                                      



                                                                      



              Problem                                                  



                                                                      



                                                                      



              2021                                                  



                                                                      



                                                                      



                                                                      



                                                                      



                 Marky Hendrickson                                                  



                                                                      



                                                                      

 

       Chronic        Problem Active               2021               [facility]

[facility]



       major                                     02:00:52               l



       depressive   Chronic                                                  [facility]

[place]



       disorder, major                                                   



       recurrent depressive                                                  



       episode disorder,                                                  



              recurrent                                                  



              episode                                                  



                                                                      



                                                                      



              Active                                                  



                                                                      



                                                                      



                                                                      



                                                                      



              Problem                                                  



                                                                      



                                                                      



              2021                                                  



                                                                      



                                                                      



                                                                      



                                                                      



                 Marky Hendrickson                                                  



                                                                      



                                                                      

 

       DM neuro        Problem Active               2021               [facility]

[facility]



       manif type                                    02:00:52               l



       II       DM neuro                                                  Art

n



              manif type                                                  



              II                                                      



                                                                      



                  Active                                                  



                                                                      



                                                                      



                                                                      



                                                                      



                 Problem                                                  



                                                                      



                                                                      



                                                                      



                                                                      



                                                                      



                                                                      



                                                                      



              Marky Hendrickson                                                  



                                                                      



                                                                      

 

       Chronic        Problem Active               2021               [facility]

[facility]



       kidney                                    02:00:52               l



       disease   Chronic                                                  Art

n



       due to kidney                                                  



       diabetes disease                                                  



       mellitus due to                                                  



              diabetes                                                  



              mellitus                                                  



                                                                      



                                                                      



              Active                                                  



                                                                      



                                                                      



                                                                      



                                                                      



              Problem                                                  



                                                                      



                                                                      



              2021                                                  



                                                                      



                                                                      



                                                                      



                                                                      



                 Marky Hendrickson                                                  



                                                                      



                                                                      

 

       Body mass        Diagnosis Active               2021               

[facility]



       index                                     02:00:03               l



       (BMI) of   Body                                                  [place]



       27.0-27.9 mass index                                                  



       in adult (BMI) of                                                  



              27.0-27.9                                                  



              in adult                                                  



                                                                      



                                                                      



              Active                                                  



                                                                      



                                                                      



                                                                      



                                                                      



              Diagnosis                                                  



                                                                      



                                                                      



              2021                                                  



                                                                      



                                                                      



                                                                      



                                                                      



                 Marky Hendrickson                                                  



                                                                      



                                                                      

 

       Left          Diagnosis Active               2019-03-15               [facility]

[facility]



       anterior                                    02:01:33               l



       shoulder   Left                                                  [facility]



       pain   anterior                                                  



              shoulder                                                  



              pain                                                    



                                                                      



                                                                      



              Active                                                  



                                                                      



                                                                      



                                                                      



                                                                      



              Diagnosis                                                  



                                                                      



                                                                      



              03/15/2019                                                  



                                                                      



                                                                      



                                                                      



                                                                      



                 Marky Hendrickson                                                  



                                                                      



                                                                      

 

       Colonic        Problem Active               2021               [facility]

[facility]



       constipati                                    02:00:52               l



       on       Colonic                                                  [facility]



              constipati                                                  



              on                                                      



                                                                      



                  Active                                                  



                                                                      



                                                                      



                                                                      



                                                                      



                 Problem                                                  



                                                                      



                                                                      



                                                                      



                                                                      



                                                                      



                                                                      



                                                                      



              Marky Hendrickson                                                  



                                                                      



                                                                      

 

       Acute         Problem Active               2021               [facility]

[facility]



       allergic                                    02:00:52               l



       rhinitis   Acute                                                  [place]



       due to allergic                                                  



       pollen rhinitis                                                  



              due to                                                  



              pollen                                                  



                                                                      



                                                                      



              Active                                                  



                                                                      



                                                                      



                                                                      



                                                                      



              Problem                                                  



                                                                      



                                                                      



              2021                                                  



                                                                      



                                                                      



                                                                      



                                                                      



                 Marky                                                  



              E Buxbaum                                                  



                                                                      



                                                                      







Allergies, Adverse Reactions, Alerts







       Allergy Allergy Status Severity Reaction(s) Onset  Inactive Treating Comm

ents 

Source



       Name   Type                        Date   Date   Clinician        

 

       Morphine Morphine Active        Info Not                       Hung

obinna



       Sulfate Sulfate               Available 4-12                        l



                                          00:00:                                                

 

       Morpholi Propensi Active        Other (See                       [facility]

I [facility]     ty to                Comments)                         [facility] -



       Analogue adverse                      00:00:                      Medical



       s      reaction                      00                          Center



              s                                                       







Family History







           Family Member Diagnosis  Comments   Start Date Stop Date  Source

 

           Natural mother Diabetes                                    [facility]

 

           Natural mother Hypertension                                  [facility]

 

           Natural daughter No Known Problem                                  [facility]

I [facility]

 

           Natural father Asthma                                      [facility]

 

           Natural father Diabetes                                    [facility]

 

           Natural father Hypertension                                  [facility]







Social History







           Social Habit Start Date Stop Date  Quantity   Comments   Source

 

           History SDOH                                             [facility]

 -



           Alcohol Std Drinks                                             [facility]

 [facility]

 

           History SDOH                                             [facility]



           Alcohol Binge                                             [facility]

ter

 

           Sex Assigned At                                             [facility]

 

           Tobacco use and 2020 Never used            [facility] -



           exposure   00:00:00   00:00:00                         [facility]

 

           Alcohol intake 2020 Current               [facility]



                      00:00:00   00:00:00   non-drinker of            Medical Ce

nter



                                            alcohol               



                                            (finding)             

 

           History SDOH 2020 1                     [facility]

 -



           Alcohol Frequency 00:00:00   00:00:00                         [facility]









                Smoking Status  Start Date      Stop Date       Source

 

                Never smoker                                    [facility]







Medications







       Ordered Filled Start  Stop   Current Ordering Indication Dosage Frequency

 Signature

                    Comments            Components          Source



     Medication Medication Date Date Medication? Clinician                (SIG) 

          



     Name Name                                                   

 

     Lisinopril            Yes  Marky                1 tablet           

[facility]



               4-21           Buxbaum                               l



               02:00:                                                                                              

 

     GlyBURIDE            Yes  Marky                1 tablet           M

[facility]



               4-14           Buxbaum                with           l



               02:00:                               breakfast                                            or the           



                                                  first main           



                                                  meal of           



                                                  the day           

 

     Aspirin 81            Yes  Marky                1 tablet           

[facility]



               4-14           Buxbaum                               l



               02:00:                                                                                              

 

     Tamsulosin            Yes  Marky                as             Hung

obinna



     HCl       4-14           Buxbaum                directed           l



               02:00:                                                                                              

 

     Senna            Yes  Marky                1 tablet           [facility]

ia



     Laxative      4-14           Buxbaum                at bedtime           l



               02:00:                               as needed           [place]                                                

 

     Simvastatin            Yes  Marky                1 tablet          

 [facility]



               4-14           Buxbaum                in the           l



               02:00:                               evening           [facility]                                                

 

     Valsartan            Yes  Marky                1 tablet           M

[facility]



               4-12           Buxbaum                               l



               00:00:                                              [facility]



                                                               

 

     Amoxicillin      -0      Yes  Marky                1 tablet          

 [facility]



     -Pot      4-12           Buxbaum                               l



     Clavulanate      00:00:                                              Art

n



                                                               

 

     Polyethylen      -      Yes  Marky                1 packet          

 [facility]



     e Glycol      1-16           Buxbaum                mixed with           l



     3350      00:00:                               8 ounces           [place]                                 of fluid           

 

     Senna S      2020      Yes  Marky                1 tablet           Mem

oria



               1-16           Buxbaum                               l



               00:00:                                              [place]                                                

 

     HydrOXYzine      -0      Yes  Marky                1 tablet          

 [facility]



     HCl       9-01           Buxbaum                as needed           l



               00:00:                               for            [place]                                 itching           

 

     Simvastatin      -0      Yes  Marky                1 tablet          

 [facility]



               8-12           Buxbaum                in the           l



               02:01:                               evening           Phil



               59                                                

 

     lisinopriL      2020-0      Yes            40mg QD   Take 40 mg           C

[facility]



     (PRINIVIL,Z      6-22                               by mouth           [facility]

s -



     ESTRIL) 40      12:12:                               daily.           Med[facility]

al



     MG tablet      50                                                Center

 

     simvastatin      2020-0      Yes            20mg QD   Take 20 mg           

[facility]



     (ZOCOR) 20      6-22                               by mouth           [facility]

 -



     MG tablet      12:12:                               nightly.           Medi

bambi



               50                                                Center

 

     tamsulosin      2020-0      Yes            .8mg QD   Take 0.8           [facility]



     (FLOMAX)      6-22                               mg by           [facility] -



     0.4 mg Cap      12:12:                               mouth           [facility]

l



     24 hr      50                                 daily .           Center



     capsule                                                        

 

     omeprazole      2020-0      Yes            20mg QD   Take 20 mg           C

[facility]



     (PRILOSEC)      6-22                               by mouth           [facility]

 -



     20 MG      12:12:                               daily.           Medical



     capsule      50                                                Center

 

     finasteride      2020-0      Yes            5mg  QD   Take 5 mg           C

[facility]



     (PROSCAR) 5      6-22                               by mouth           [facility]

s -



     mg tablet      12:12:                               daily.           [facility]

l



               50                                                Center

 

     hydrOXYzine      2020-0      Yes            25mg      Take 25 mg           

[facility]



     (ATARAX) 25      6-22                               by mouth           [facility]

s -



     MG tablet      12:12:                               daily as           Medi

bambi



               50                                 needed for           Center



                                                  Itching .           

 

     glyBURIDE      -      Yes            2.5mg QD   Take 2.5           [facility]



     (DIABETA)      6-22                               mg by           [facility] -



     2.5 MG      12:12:                               mouth           Medical



     tablet      50                                 daily .           Center

 

     aspirin 81      -0      Yes            81mg QD   Take 81 mg           C

[facility]



     MG chewable      6-22                               by mouth           [facility]

s -



     tablet      12:12:                               daily.           Medical



               50                                                Center

 

     methocarbam      -0      Yes            500mg Q.25D Take 500           

[facility]



     oL        6-22                               mg by           [facility] -



     (ROBAXIN)      12:12:                               mouth 4           Medic

al



     500 MG      50                                 (four)           Center



     tablet                                         times           



                                                  daily.           

 

     Meloxicam      -0      Yes  Marky                1 tablet           M

[facility]oria



               3-13           Buxbaum                               l



               00:00:                                              [place]



                                                               

 

     Citalopram      -0      Yes  Marky                1 tablet           

[facility]



     Hydrobromid      3-13           Buxbaum                               l



     e         00:00:                                              [place]



                                                               

 

     Escitalopra      -      Yes  Marky                1 tablet          

 [facility]



     m Oxalate      0-22           B[facility]baum                               l



               00:00:                                              [place]                                                







Vital Signs







             Vital Name   Observation Time Observation Value Comments     Source

 

             Weight       2021 13:45:00                           [facility]

 [place]

 

             Height       2021 13:45:00                           [facility]

 [place]

 

             Temperature Oral (F) 2021 13:45:00 98.1 F                    

[facility]

 

             Heart Rate   2021 13:45:00                           [facility]

 [place]

 

             Diastolic (mm Hg) 2021 13:45:00                           [facility]

orial Ph[facility]

 

             Systolic (mm Hg) 2021 13:45:00                           [facility]

[facility]

 

             Weight       2020 15:30:00                           [facility]

 [facility]

 

             Height       2020 15:30:00                           [facility]

 [place]

 

             Heart Rate   2020 15:30:00                           [facility]

 [facility]

 

             Diastolic (mm Hg) 2020 15:30:00                           [facility]

[facility] [place]

 

             Systolic (mm Hg) 2020 15:30:00                           Hung

[facility]

 

             Weight       2020 19:15:00                           [facility]

 [place]

 

             Height       2020 19:15:00                           [facility]

 [place]

 

             Heart Rate   2020 19:15:00                           [facility]

 [place]

 

             Diastolic (mm Hg) 2020 19:15:00                           [facility]

orial [facility]

 

             Systolic (mm Hg) 2020 19:15:00                           [facility] [place]

 

             Systolic blood 2020 11:58:00 148 mm[Hg]                [facility]

 

             Diastolic blood 2020 11:58:00 94 mm[Hg]                 [facility]

 

             Heart rate   2020 11:58:00 97 /min                   [facility]

 

             Body temperature 2020 11:58:00 36.94 Radha                 [facility]

 

             Respiratory rate 2020 11:58:00 16 /min                   [facility]

 

             Body height  2020 11:58:00 177.8 cm                  [facility]

 

             Body weight  2020 11:58:00 83.915 kg                 [facility]

 

             BMI          2020 11:58:00 26.54 kg/m2               [facility]

 

             Oxygen saturation in 2020 11:58:00 96 /min                   

[facility] -



             Arterial blood by                                        Medical Ce

nter



             Pulse oximetry                                        

 

             Weight       2020 16:15:00                           [facility]

 [place]

 

             Height       2020 16:15:00                           [facility]

 [facility]

 

             Heart Rate   2020 16:15:00                           [facility]

 [place]

 

             Diastolic (mm Hg) 2020 16:15:00                           Mem

orial [place]

 

             Systolic (mm Hg) 2020 16:15:00                           [facility]

 

             Weight       2019 14:45:00                           [facility]

 [place]

 

             Height       2019 14:45:00                           [facility]

 [facility]

 

             Temperature Oral (F) 2019 14:45:00 97.5 F                    

[facility] [place]

 

             Heart Rate   2019 14:45:00                           [facility]

 [facility]

 

             Diastolic (mm Hg) 2019 14:45:00                           Mem

orial [facility]

 

             Systolic (mm Hg) 2019 14:45:00                           Hung

obinnal [place]

 

             Weight       2018 15:45:00                           [facility]

 [facility]

 

             Height       2018 15:45:00                           [facility]

 [place]

 

             Temperature Oral (F) 2018 15:45:00 98.5 F                    

[facility]

 

             Heart Rate   2018 15:45:00                           [facility]

 [place]

 

             Diastolic (mm Hg) 2018 15:45:00                           Mem

orial [place]

 

             Systolic (mm Hg) 2018 15:45:00                           Hung

rial [place]

 

             Weight       2018-10-22 15:30:00                           [facility]

 [place]

 

             Height       2018-10-22 15:30:00                           [facility]

 [facility]

 

             Temperature Oral (F) 2018-10-22 15:30:00 98.1 F                    

[facility]

 

             Heart Rate   2018-10-22 15:30:00                           [facility]

 [place]

 

             Diastolic (mm Hg) 2018-10-22 15:30:00                           Mem

orial [facility]

 

             Systolic (mm Hg) 2018-10-22 15:30:00                           Hung

rial [place]







Procedures







                Procedure       Date / Time Performed Performing Clinician [facility]

e

 

                COMPREHENSIVE METABOLIC 2020 13:40:00 [facility]

 

                CBC W/PLT COUNT & AUTO 2020 13:40:00 [facility]

t [facility]

 

                AMYLASE         2020 13:40:00 Gabriel [facility]

 

                LIPASE          2020 13:40:00 Gabriel [facility]

 

                UA/M W/RFLX CULTURE, ROUT 2020 13:40:00 Gabriel [facility]

I [facility]

 

                MICROSCOPIC EXAMINATION 2020 13:40:00 [facility]







Plan of Care







             Planned Activity Planned Date Details      Comments     Source

 

             Future Scheduled 2020   INFLUENZA VACCINE (#1)              [facility]

[facility]



             Test         00:00:00     [code = INFLUENZA              Medical Ce

nter



                                       VACCINE (#1)]              

 

             Future Scheduled 2020   Hemoglobin A1c              [facility]

[facility] -



             Test         00:00:00     [facility]



                                       (procedure) [code =              



                                       67947638]                 

 

             Future Scheduled 2020   Medicare IPPE (WELCOME              C

[facility]



             Test         00:00:00     TO MEDICARE) [code =              Medical

 Center



                                       Medicare IPPE (WELCOME              



                                       TO MEDICARE)]              

 

             Future Scheduled 2013   PNEUMOCOCCAL 65+ YRS              [facility]



             Test         00:00:00     (1 of 1 -                 Medical Center



                                       [id number])              



                                       [code = PNEUMOCOCCAL              



                                       65+ YRS (1 of 1 -              



                                       [id number])]              

 

             Future Scheduled 1958   DIABETIC EYE EXAM              [facility]



             Test         00:00:00     [code = DIABETIC EYE              Medical

 Center



                                       EXAM]                     

 

             Future Scheduled 1958   Diabetic foot              [facility]

es -



             Test         00:00:00     examination               Medical Center



                                       (regime/therapy) [code              



                                       = 594706688]              

 

             Future Scheduled 1958   Urine screening for              [facility]



             Test         00:00:00     protein (procedure)              Medical 

Center



                                       [code = 269458193]              

 

             Future Scheduled 1948   Screening for              [facility]

es -



             Test         00:00:00     malignant neoplasm of              [facility]ica

l Center



                                       colon (procedure)              



                                       [code = 807717471]              







Encounters







        Start   End     Encounter Admission Attending Care    Care    Encounter 

Source



        Date/Time Date/Time Type    Type    Clinicians Facility Department ID   

   

 

        2021 Outpatient         Katarzyna ALBER     [facility]     [id number]

[id number] 



        00:00:00 00:00:00                 Marky [id number] 



                                                                [id number] 



                                                                [id number]  

 

        2021 Outpatient                 Marky MORALES 161

479  [facility]



        15:45:00 15:45:00                         Buxbaum Buxbaum         alWork

s



                                                DOPA    DOPA            

 

        2021 Outpatient                 Marky MORALES 161

001  [facility]inic



        08:45:00 08:45:00                         Buxbaum Buxbaum         alWork

s



                                                DOPA    DOPA            

 

        2020 Outpatient                 Marky MORALES 155

287  [facility]inic



        10:44:00 10:44:00                         Buxbaum Buxbaum         alWork

s



                                                DOPA    DOPA            

 

        2020 Outpatient                 Marky MORALES 152

501  [facility]inic



        09:34:00 09:34:00                         Buxbaum Buxbaum         alWork

s



                                                DOPA    DOPA            

 

        2020 Outpatient                 Marky MORALES 151

742  [facility]inic



        10:30:00 10:30:00                         Buxbaum Buxbaum         alWork

s



                                                DOPA    DOPA            

 

        2020 Outpatient                 Marky MORALES 150

301  [facility]inic



        14:15:00 14:15:00                         Buxbaum Buxbaum         alWork

s



                                                DOPA    DOPA            

 

        2020 Outpatient                 Marky MORALES 148

474  [facility]Clinic



        11:15:00 11:15:00                         Buxbaum Buxbaum         alWork

s



                                                DOPA    DOPA            

 

        2019-03-15 2019-03-15 Outpatient                 Marky MORALES 128

112  [facility]inic



        14:12:00 14:12:00                         Buxbaum Buxbaum         alWork

s



                                                DOPA    DOPA            

 

        2019 Outpatient                 Marky MORALES 125

533  [facility]inic



        09:45:00 09:45:00                         Buxbaum Buxbaum         alWork

s



                                                DOPA    DOPA            

 

        2018 Outpatient                 Marky MORALES 123

281  [facility]inic



        09:45:00 09:45:00                         Buxbaum Buxbaum         alWork

s



                                                DOPA    DOPA            

 

        2018-10-22 2018-10-22 Outpatient                 Marky MORALES 119

935  [facility]inic



        10:30:00 10:30:00                         Buxbaum Buxbaum         alWork

s



                                                DOPA    DOPA            







Results







           Test Description Test Time  Test Comments Results    Result Comments 

Source









                    Comprehensive metabolic panel 2020 14:10:00 









                      Test Item  Value      Reference Range Interpretation Comme

nts









             Glucose, Serum (test code 113 mg/dL    65-99        H            



             = 2011)                                          

 

             BUN (test code = 2011) 15 mg/dL     8-27                      

 

             Creatinine, Serum (test 1.29 mg/dL   0.76-1.27    H            



             code = 0347105)                                        

 

             eGFR If NonAfricn Am (test 55 mL/min/1.73 >59          L           

 



             code = 0316176)                                        

 

             eGFR If Africn Am (test 64 mL/min/1.73 >59                       



             code = 4396178)                                        

 

             BUN/Creatinine Ratio (test 12           10-24                     



             code = 4782909)                                        

 

             Sodium, Serum (test code = 139 mmol/L   134-144                   



             2461426)                                            

 

             Potassium, Serum (test 4.1 mmol/L   3.5-5.2                   



             code = 3263869)                                        

 

             Chloride, Serum (test code 100 mmol/L                       



             = 2011)                                          

 

             Carbon Dioxide, Total 23 mmol/L    20-29                     



             (test code = 9205452)                                        

 

             Calcium, Serum (test code 9.2 mg/dL    8.6-10.2                  



             = 2011)                                          

 

             Protein, Total, Serum 7.3 g/dL     6-8.5                     



             (test code = 2011)                                        

 

             Albumin, Serum (test code 4.4 g/dL     3.7-4.7                   



             = 2011)                                          

 

             Globulin, Total (test code 2.9 g/dL     1.5-4.5                   



             = )                                          

 

             A/G Ratio (test code = 1.5          1.2-2.2                   



             )                                            

 

             Bilirubin, Total (test 0.3 mg/dL    0-1.2                     



             code = 9194255)                                        

 

             Alkaline Phosphatase, S 91           See_Comment                [Au

tomated message]



             (test code = 6768-6)                                        The s

tem which



                                                                 generated this 

result



                                                                 transmitted ref

erence



                                                                 range: 39 - 117

 IU/L.



                                                                 The reference r

ben was



                                                                 not used to int

erpret



                                                                 this result as



                                                                 normal/abnormal

.

 

             AST (SGOT) (test code = 22           See_Comment                [Au

tomated message]



             2011)                                            The system [employer]



                                                                 generated this 

result



                                                                 transmitted ref

erence



                                                                 range: 0 - 40 I

U/L. The



                                                                 reference range

 was not



                                                                 used to interpr

et this



                                                                 result as



                                                                 normal/abnormal

.

 

             ALT (SGPT) (test code = 27           See_Comment                [Au

tomated message]



             )                                            The system [employer]



                                                                 generated this 

result



                                                                 transmitted ref

erence



                                                                 range: 0 - 44 I

U/L. The



                                                                 reference range

 was not



                                                                 used to interpr

et this



                                                                 result as



                                                                 normal/abnormal

.

 

             [facility] (test code = FANY) Performed at:                            

 



                          [address]                            



                          [id number] Director:                           



                          Avi Serrano MD, Phone:                           



                          [phone]                             

 

             Lab Interpretation (test Abnormal                               



             code = 28991-3)                                        



[facility]





             Test Item    Value        Reference Range Interpretation Comments

 

             Amylase, Serum (test 85 U/L                           



             code = 1828461)                                        

 

             [facility] (test code = Performed at:                             



             [facility])         [address]                           



                          [id number] Director:                           



                          Avi Serrano MD, Phone:                           



                          [phone]                             



[facility]





             Test Item    Value        Reference Range Interpretation Comments

 

             Lipase, Serum (test 24 U/L       13-78                     



             code = 7348215)                                        

 

             [facility] (test code = Performed at:                             



             [facility])         [address]                           



                          [id number] Director:                           



                          Avi Serrano MD, Phone:                           



                          [phone]                             



[facility] with platelet count + automated [id number] 
14:10:00





             Test Item    Value        Reference Range Interpretation Comments

 

             WBC (test code = 6.4          See_Comment                [Automated



             )                                            message] The



                                                                 system which



                                                                 generated this



                                                                 result transmit

mina



                                                                 reference range

:



                                                                 3.4 - 10.8



                                                                 x10E3/uL. The



                                                                 reference range



                                                                 was not used to



                                                                 interpret this



                                                                 result as



                                                                 normal/abnormal

.

 

             RBC (test code = 5.50         See_Comment                [Automated



             9-8)                                              message] The



                                                                 system which



                                                                 generated this



                                                                 result transmit

mina



                                                                 reference range

:



                                                                 4.14 - 5.80



                                                                 x10E6/uL. The



                                                                 reference range



                                                                 was not used to



                                                                 interpret this



                                                                 result as



                                                                 normal/abnormal

.

 

             Hemoglobin (test 15.0 g/dL    13-17.7                   



             code = )                                        

 

             Hematocrit (test 45.6 %       37.5-51                   



             code = )                                        

 

             MCV (test code = 83 fL        79-97                     



             )                                            

 

             MCH (test code = 27.3 pg      26.6-33                   



             )                                            

 

             MCHC (test code = 32.9 g/dL    31.5-35.7                 



             )                                            

 

             RDW (test code = 13.9 %       11.6-15.4                 



             )                                            

 

             Platelets (test 260          See_Comment                [Automated



             code = )                                        message] The



                                                                 system which



                                                                 generated this



                                                                 result transmit

mina



                                                                 reference range

:



                                                                 150 - 450



                                                                 x10E3/uL. The



                                                                 reference range



                                                                 was not used to



                                                                 interpret this



                                                                 result as



                                                                 normal/abnormal

.

 

             % Neutros (test 44 %         Not Estab.                



             code = )                                        

 

             % Lymphs (test 42 %         Not Estab.                



             code = )                                        

 

             % Monos (test code 8 %          Not Estab.                



             = )                                          

 

             % Eos (test code = 5 %          Not Estab.                



             )                                            

 

             % Baso (test code 1 %          Not Estab.                



             = )                                          

 

             # Neutros (test 2.8          See_Comment                [Automated



             code = )                                        message] The



                                                                 system which



                                                                 generated this



                                                                 result transmit

mina



                                                                 reference range

:



                                                                 1.4 - 7.0



                                                                 x10E3/uL. The



                                                                 reference range



                                                                 was not used to



                                                                 interpret this



                                                                 result as



                                                                 normal/abnormal

.

 

             # Lymphs (test 2.7          See_Comment                [Automated



             code = )                                        message] The



                                                                 system which



                                                                 generated this



                                                                 result transmit

mina



                                                                 reference range

:



                                                                 0.7 - 3.1



                                                                 x10E3/uL. The



                                                                 reference range



                                                                 was not used to



                                                                 interpret this



                                                                 result as



                                                                 normal/abnormal

.

 

             # Monos (test code 0.5          See_Comment                [Automat

ed



             = )                                          message] The



                                                                 system which



                                                                 generated this



                                                                 result transmit

mina



                                                                 reference range

:



                                                                 0.1 - 0.9



                                                                 x10E3/uL. The



                                                                 reference range



                                                                 was not used to



                                                                 interpret this



                                                                 result as



                                                                 normal/abnormal

.

 

             # Eos (test code = 0.3          See_Comment                [Automat

ed



             )                                            message] The



                                                                 system which



                                                                 generated this



                                                                 result transmit

mina



                                                                 reference range

:



                                                                 0.0 - 0.4



                                                                 x10E3/uL. The



                                                                 reference range



                                                                 was not used to



                                                                 interpret this



                                                                 result as



                                                                 normal/abnormal

.

 

             Baso (Absolute) 0.1          See_Comment                [Automated



             (test code =                                        message] The



             )                                            system which



                                                                 generated this



                                                                 result transmit

mina



                                                                 reference range

:



                                                                 0.0 - 0.2



                                                                 x10E3/uL. The



                                                                 reference range



                                                                 was not used to



                                                                 interpret this



                                                                 result as



                                                                 normal/abnormal

.

 

             % Immature Grans 0 %          Not Estab.                



             (test code =                                        



             )                                            

 

             # Immature Grans 0.0          See_Comment                [Automated



             (test code =                                        message] The



             )                                            system which



                                                                 generated this



                                                                 result transmit

mina



                                                                 reference range

:



                                                                 0.0 - 0.1



                                                                 x10E3/uL. The



                                                                 reference range



                                                                 was not used to



                                                                 interpret this



                                                                 result as



                                                                 normal/abnormal

.

 

             [facility] (test code = Performed at:                             



             [facility])         - [facility]                              



                          [address] [place] [address]  [id number]                           



                          Director: Avi Serrano MD, Phone:                           



                          [phone]                             



[facility], [id number] 14:10:00





             Test Item    Value        Reference Range Interpretation Comments

 

             Specific Gravity, 1.013        1.005-1.03                



             UA (test code =                                        



             2965-2)                                             

 

             pH, UA (test code = 7.0          5.0-7.5                   



             5803-2)                                             

 

             Color, UA (test Yellow       Yellow                    



             code = 9796-4)                                        

 

             Appearance (test Clear        Clear                     



             code = 1704528)                                        

 

             WBC Esterase (test Negative     Negative                  



             code = 9306754)                                        

 

             Protein, UA (test Negative     Negative/T                



             code = 11942-6)                                        

 

             Glucose, Urine Negative     Negative                  



             (test code =                                        



             91400-3)                                            

 

             Ketones, UA (test Negative     Negative                  



             code = 7534-8)                                        

 

             Blood, UA (test Negative     Negative                  



             code = 42326-2)                                        

 

             Bilirubin, UA (test Negative     Negative                  



             code = 5770-3)                                        

 

             Urobilinogen,Semi-Q 0.2 mg/dL    0.2-1                     



             n (test code =                                        



             0398289)                                            

 

             Nitrite, UA (test Negative     Negative                  



             code = 20407-3)                                        

 

             Microscopic  Comment                                Microscopic



             Examination (test                                        follows if



             code = 3368901)                                        indicated.

 

             Microscopic  See below:                             Microscopic was



             Examination (test                                        indicated 

and was



             code = 4906009)                                        performed.

 

             Urinalysis Reflex Comment                                This speci

men will



             (test code =                                        not reflex to a



             )                                            Urine Culture.

 

             [facility] (test code = Performed at:                             



             [facility])         - [facility]                              



                          [address]  [id number]                           



                          Director: Avi Serrano MD, Phone:                           



                          [phone]                             



[facility] [id number] 14:10:00





             Test Item    Value        Reference Range Interpretation Comments

 

             WBC (test code = 0-5          See_Comment                [Automated



             2011)                                            message] The



                                                                 system which



                                                                 generated this



                                                                 result transmit

mina



                                                                 reference range

: 0



                                                                 - 5 /hpf. The



                                                                 reference range



                                                                 was not used to



                                                                 interpret this



                                                                 result as



                                                                 normal/abnormal

.

 

             RBC (test code = 0-2          See_Comment                [Automated



             2011)                                            message] The



                                                                 system which



                                                                 generated this



                                                                 result transmit

mina



                                                                 reference range

: 0



                                                                 - 2 /hpf. The



                                                                 reference range



                                                                 was not used to



                                                                 interpret this



                                                                 result as



                                                                 normal/abnormal

.

 

             Epithelial Cells None seen    See_Comment                [Automated



             (non renal) (test                                        message] T

he



             code = 1992389)                                        system which



                                                                 generated this



                                                                 result transmit

mina



                                                                 reference range

: 0



                                                                 - 10 /hpf. The



                                                                 reference range



                                                                 was not used to



                                                                 interpret this



                                                                 result as



                                                                 normal/abnormal

.

 

             Bacteria (test None seen    None seen/                



             code = [id number])                                        

 

             [facility] (test code = Performed at:                             



             [facility])         - [facility]                              



                          [id number] [place], [place]  [id number]                           



                          Director: Avi Serrano MD, Phone:                           



                          [phone]                             



[facility]